# Patient Record
Sex: FEMALE | Race: WHITE | NOT HISPANIC OR LATINO
[De-identification: names, ages, dates, MRNs, and addresses within clinical notes are randomized per-mention and may not be internally consistent; named-entity substitution may affect disease eponyms.]

---

## 2017-01-10 ENCOUNTER — APPOINTMENT (OUTPATIENT)
Dept: DERMATOLOGY | Facility: CLINIC | Age: 72
End: 2017-01-10

## 2017-02-28 ENCOUNTER — APPOINTMENT (OUTPATIENT)
Dept: OBGYN | Facility: CLINIC | Age: 72
End: 2017-02-28

## 2017-02-28 VITALS
BODY MASS INDEX: 20.61 KG/M2 | HEIGHT: 68 IN | WEIGHT: 136 LBS | DIASTOLIC BLOOD PRESSURE: 74 MMHG | SYSTOLIC BLOOD PRESSURE: 122 MMHG

## 2017-02-28 DIAGNOSIS — F32.9 ANXIETY DISORDER, UNSPECIFIED: ICD-10-CM

## 2017-02-28 DIAGNOSIS — E03.9 HYPOTHYROIDISM, UNSPECIFIED: ICD-10-CM

## 2017-02-28 DIAGNOSIS — F41.9 ANXIETY DISORDER, UNSPECIFIED: ICD-10-CM

## 2017-02-28 RX ORDER — BUPROPION HYDROCHLORIDE 150 MG/1
150 TABLET, EXTENDED RELEASE ORAL
Refills: 0 | Status: ACTIVE | COMMUNITY

## 2017-03-06 LAB — CYTOLOGY CVX/VAG DOC THIN PREP: NORMAL

## 2017-12-28 DIAGNOSIS — N64.89 OTHER SPECIFIED DISORDERS OF BREAST: ICD-10-CM

## 2018-01-29 ENCOUNTER — APPOINTMENT (OUTPATIENT)
Dept: DERMATOLOGY | Facility: CLINIC | Age: 73
End: 2018-01-29
Payer: MEDICARE

## 2018-01-29 PROCEDURE — 17000 DESTRUCT PREMALG LESION: CPT

## 2018-01-29 PROCEDURE — 99214 OFFICE O/P EST MOD 30 MIN: CPT | Mod: 25

## 2018-03-01 ENCOUNTER — APPOINTMENT (OUTPATIENT)
Dept: OBGYN | Facility: CLINIC | Age: 73
End: 2018-03-01
Payer: MEDICARE

## 2018-03-01 VITALS
SYSTOLIC BLOOD PRESSURE: 128 MMHG | WEIGHT: 136 LBS | BODY MASS INDEX: 20.61 KG/M2 | DIASTOLIC BLOOD PRESSURE: 80 MMHG | HEIGHT: 68 IN

## 2018-03-01 PROCEDURE — 99214 OFFICE O/P EST MOD 30 MIN: CPT

## 2018-06-25 ENCOUNTER — APPOINTMENT (OUTPATIENT)
Dept: DERMATOLOGY | Facility: CLINIC | Age: 73
End: 2018-06-25
Payer: MEDICARE

## 2018-06-25 PROCEDURE — 17003 DESTRUCT PREMALG LES 2-14: CPT

## 2018-06-25 PROCEDURE — 17000 DESTRUCT PREMALG LESION: CPT

## 2018-06-25 PROCEDURE — 99213 OFFICE O/P EST LOW 20 MIN: CPT | Mod: 25

## 2018-12-05 ENCOUNTER — APPOINTMENT (OUTPATIENT)
Dept: DERMATOLOGY | Facility: CLINIC | Age: 73
End: 2018-12-05
Payer: MEDICARE

## 2018-12-05 PROCEDURE — 99214 OFFICE O/P EST MOD 30 MIN: CPT | Mod: 25

## 2018-12-05 PROCEDURE — 17000 DESTRUCT PREMALG LESION: CPT | Mod: 59

## 2018-12-05 PROCEDURE — 17110 DESTRUCTION B9 LES UP TO 14: CPT

## 2019-01-28 ENCOUNTER — APPOINTMENT (OUTPATIENT)
Dept: DERMATOLOGY | Facility: CLINIC | Age: 74
End: 2019-01-28

## 2019-04-05 ENCOUNTER — APPOINTMENT (OUTPATIENT)
Dept: OBGYN | Facility: CLINIC | Age: 74
End: 2019-04-05
Payer: MEDICARE

## 2019-04-05 VITALS
BODY MASS INDEX: 20.61 KG/M2 | WEIGHT: 136 LBS | SYSTOLIC BLOOD PRESSURE: 120 MMHG | HEIGHT: 68 IN | DIASTOLIC BLOOD PRESSURE: 80 MMHG

## 2019-04-05 PROCEDURE — 82270 OCCULT BLOOD FECES: CPT

## 2019-04-05 PROCEDURE — G0101: CPT

## 2019-04-05 NOTE — PHYSICAL EXAM
[Awake] : awake [Alert] : alert [Soft] : soft [Oriented x3] : oriented to person, place, and time [Normal] : uterus [No Bleeding] : there was no active vaginal bleeding [Uterine Adnexae] : were not tender and not enlarged [RRR, No Murmurs] : RRR, no murmurs [CTAB] : CTAB [Acute Distress] : no acute distress [Mass] : no breast mass [Nipple Discharge] : no nipple discharge [Axillary LAD] : no axillary lymphadenopathy [Tender] : non tender [Occult Blood] : occult blood test from digital rectal exam was negative

## 2019-04-08 ENCOUNTER — APPOINTMENT (OUTPATIENT)
Dept: DERMATOLOGY | Facility: CLINIC | Age: 74
End: 2019-04-08
Payer: MEDICARE

## 2019-04-08 PROCEDURE — 99214 OFFICE O/P EST MOD 30 MIN: CPT

## 2019-04-11 LAB — CYTOLOGY CVX/VAG DOC THIN PREP: NORMAL

## 2019-05-23 ENCOUNTER — RESULT REVIEW (OUTPATIENT)
Age: 74
End: 2019-05-23

## 2019-05-23 ENCOUNTER — APPOINTMENT (OUTPATIENT)
Dept: DERMATOLOGY | Facility: CLINIC | Age: 74
End: 2019-05-23
Payer: MEDICARE

## 2019-05-23 PROCEDURE — 11104 PUNCH BX SKIN SINGLE LESION: CPT

## 2019-05-23 PROCEDURE — 99213 OFFICE O/P EST LOW 20 MIN: CPT | Mod: 25

## 2019-06-06 ENCOUNTER — APPOINTMENT (OUTPATIENT)
Dept: DERMATOLOGY | Facility: CLINIC | Age: 74
End: 2019-06-06
Payer: MEDICARE

## 2019-06-06 PROCEDURE — ZZZZZ: CPT

## 2019-12-13 ENCOUNTER — APPOINTMENT (OUTPATIENT)
Dept: ORTHOPEDIC SURGERY | Facility: CLINIC | Age: 74
End: 2019-12-13

## 2020-06-01 ENCOUNTER — APPOINTMENT (OUTPATIENT)
Dept: PULMONOLOGY | Facility: CLINIC | Age: 75
End: 2020-06-01
Payer: MEDICARE

## 2020-06-01 VITALS
DIASTOLIC BLOOD PRESSURE: 80 MMHG | SYSTOLIC BLOOD PRESSURE: 138 MMHG | WEIGHT: 128 LBS | BODY MASS INDEX: 20.09 KG/M2 | HEART RATE: 84 BPM | HEIGHT: 67 IN | RESPIRATION RATE: 16 BRPM | OXYGEN SATURATION: 98 %

## 2020-06-01 PROCEDURE — 99204 OFFICE O/P NEW MOD 45 MIN: CPT

## 2020-06-01 RX ORDER — LEVOTHYROXINE SODIUM 0.07 MG/1
75 TABLET ORAL
Qty: 90 | Refills: 0 | Status: DISCONTINUED | COMMUNITY
Start: 2020-01-22 | End: 2020-06-01

## 2020-06-01 RX ORDER — ESTRADIOL 0.1 MG/G
0.1 CREAM VAGINAL
Qty: 1 | Refills: 3 | Status: DISCONTINUED | COMMUNITY
Start: 2018-03-01 | End: 2020-06-01

## 2020-06-01 RX ORDER — VALACYCLOVIR 1 G/1
1 TABLET, FILM COATED ORAL
Qty: 10 | Refills: 0 | Status: ACTIVE | COMMUNITY
Start: 2020-01-22

## 2020-06-01 RX ORDER — IBUPROFEN 800 MG/1
800 TABLET, FILM COATED ORAL
Qty: 30 | Refills: 0 | Status: ACTIVE | COMMUNITY
Start: 2020-05-01

## 2020-06-01 RX ORDER — HYDROCODONE BITARTRATE AND HOMATROPINE METHYLBROMIDE 5; 1.5 MG/5ML; MG/5ML
5-1.5 SOLUTION ORAL
Qty: 240 | Refills: 0 | Status: ACTIVE | COMMUNITY
Start: 2020-04-13

## 2020-06-01 RX ORDER — LEVOTHYROXINE SODIUM 0.09 MG/1
88 TABLET ORAL
Qty: 90 | Refills: 0 | Status: ACTIVE | COMMUNITY
Start: 2020-04-18

## 2020-06-01 RX ORDER — LEVOTHYROXINE SODIUM 0.05 MG/1
50 TABLET ORAL
Refills: 0 | Status: DISCONTINUED | COMMUNITY
End: 2020-06-01

## 2020-06-01 RX ORDER — OMEPRAZOLE 40 MG/1
40 CAPSULE, DELAYED RELEASE ORAL
Qty: 90 | Refills: 0 | Status: ACTIVE | COMMUNITY
Start: 2020-04-18

## 2020-06-01 NOTE — CONSULT LETTER
[Dear  ___] : Dear  [unfilled], [Consult Letter:] : I had the pleasure of evaluating your patient, [unfilled]. [Please see my note below.] : Please see my note below. [Consult Closing:] : Thank you very much for allowing me to participate in the care of this patient.  If you have any questions, please do not hesitate to contact me. [DrBekah  ___] : Dr. ANNA [Sincerely,] : Sincerely,

## 2020-06-01 NOTE — PHYSICAL EXAM
[No Acute Distress] : no acute distress [Normal Oropharynx] : normal oropharynx [Normal Appearance] : normal appearance [No Neck Mass] : no neck mass [Normal Rate/Rhythm] : normal rate/rhythm [Normal S1, S2] : normal s1, s2 [No Murmurs] : no murmurs [No Resp Distress] : no resp distress [Clear to Auscultation Bilaterally] : clear to auscultation bilaterally [No Abnormalities] : no abnormalities [Benign] : benign [Normal Gait] : normal gait [No Edema] : no edema [No Clubbing] : no clubbing [No Focal Deficits] : no focal deficits [Normal Color/ Pigmentation] : normal color/ pigmentation [Oriented x3] : oriented x3 [Normal Affect] : normal affect

## 2020-06-09 NOTE — HISTORY OF PRESENT ILLNESS
[TextBox_4] : Chronic recurrent cough for 30 years\par Some rhinitis\par Worse with cold exposure \par Some green mucous at times\par No better with azithromycin

## 2020-06-19 ENCOUNTER — APPOINTMENT (OUTPATIENT)
Dept: DERMATOLOGY | Facility: CLINIC | Age: 75
End: 2020-06-19
Payer: MEDICARE

## 2020-06-19 PROCEDURE — 99214 OFFICE O/P EST MOD 30 MIN: CPT | Mod: 25

## 2020-06-19 PROCEDURE — 17000 DESTRUCT PREMALG LESION: CPT

## 2020-06-19 PROCEDURE — 17003 DESTRUCT PREMALG LES 2-14: CPT

## 2020-08-06 ENCOUNTER — APPOINTMENT (OUTPATIENT)
Dept: PULMONOLOGY | Facility: CLINIC | Age: 75
End: 2020-08-06
Payer: MEDICARE

## 2020-08-06 VITALS
WEIGHT: 135 LBS | OXYGEN SATURATION: 95 % | BODY MASS INDEX: 21.14 KG/M2 | HEART RATE: 85 BPM | DIASTOLIC BLOOD PRESSURE: 84 MMHG | SYSTOLIC BLOOD PRESSURE: 126 MMHG

## 2020-08-06 DIAGNOSIS — J30.9 ALLERGIC RHINITIS, UNSPECIFIED: ICD-10-CM

## 2020-08-06 DIAGNOSIS — R05 COUGH: ICD-10-CM

## 2020-08-06 PROCEDURE — 99213 OFFICE O/P EST LOW 20 MIN: CPT

## 2020-08-06 NOTE — ASSESSMENT
[FreeTextEntry1] : Chronic Cough\par Appears to be mild and within normal limits - certainly not progressive\par Allergic rhinitis\par No significant allergy identified\par Will culture sputum when available - antibiotics have not helped in the past\par Little to add

## 2020-08-06 NOTE — PHYSICAL EXAM
[Normal Oropharynx] : normal oropharynx [No Acute Distress] : no acute distress [No Neck Mass] : no neck mass [Normal Appearance] : normal appearance [Normal S1, S2] : normal s1, s2 [Normal Rate/Rhythm] : normal rate/rhythm [Clear to Auscultation Bilaterally] : clear to auscultation bilaterally [No Murmurs] : no murmurs [No Resp Distress] : no resp distress [Benign] : benign [No Abnormalities] : no abnormalities [Normal Gait] : normal gait [No Clubbing] : no clubbing [No Edema] : no edema [Normal Color/ Pigmentation] : normal color/ pigmentation [No Focal Deficits] : no focal deficits [Oriented x3] : oriented x3 [Normal Affect] : normal affect

## 2020-08-06 NOTE — CONSULT LETTER
[Dear  ___] : Dear  [unfilled], [Consult Letter:] : I had the pleasure of evaluating your patient, [unfilled]. [Please see my note below.] : Please see my note below. [Consult Closing:] : Thank you very much for allowing me to participate in the care of this patient.  If you have any questions, please do not hesitate to contact me. [Sincerely,] : Sincerely, [DrBekah  ___] : Dr. ANNA

## 2020-08-06 NOTE — HISTORY OF PRESENT ILLNESS
[TextBox_4] : Chronic recurrent cough for 30 years\par Some rhinitis\par Worse with cold exposure \par Some green mucous at times\par No better with azithromycin\par \par 8/6/20\par Some green mucous now\par No rhinorrhea

## 2020-10-26 ENCOUNTER — APPOINTMENT (OUTPATIENT)
Dept: OBGYN | Facility: CLINIC | Age: 75
End: 2020-10-26
Payer: MEDICARE

## 2020-10-26 VITALS
SYSTOLIC BLOOD PRESSURE: 156 MMHG | WEIGHT: 135 LBS | BODY MASS INDEX: 21.19 KG/M2 | HEIGHT: 67 IN | DIASTOLIC BLOOD PRESSURE: 90 MMHG

## 2020-10-26 DIAGNOSIS — Z78.0 ASYMPTOMATIC MENOPAUSAL STATE: ICD-10-CM

## 2020-10-26 DIAGNOSIS — N95.2 POSTMENOPAUSAL ATROPHIC VAGINITIS: ICD-10-CM

## 2020-10-26 PROCEDURE — 99213 OFFICE O/P EST LOW 20 MIN: CPT

## 2020-10-26 NOTE — PHYSICAL EXAM
[Appropriately responsive] : appropriately responsive [Alert] : alert [No Acute Distress] : no acute distress [No Lymphadenopathy] : no lymphadenopathy [Regular Rate Rhythm] : regular rate rhythm [No Murmurs] : no murmurs [Clear to Auscultation B/L] : clear to auscultation bilaterally [Soft] : soft [Non-tender] : non-tender [Non-distended] : non-distended [No HSM] : No HSM [No Lesions] : no lesions [No Mass] : no mass [Oriented x3] : oriented x3 [Examination Of The Breasts] : a normal appearance [No Masses] : no breast masses were palpable [Labia Majora] : normal [Labia Minora] : normal [Normal] : normal [Uterine Adnexae] : normal [No Tenderness] : no tenderness [Nl Sphincter Tone] : normal sphincter tone [FreeTextEntry9] : guaiac neg.

## 2020-10-26 NOTE — HISTORY OF PRESENT ILLNESS
[FreeTextEntry1] : 76 yo pt here for gyn visit. has osteopenia, taking theo, vit d , doing exercise. due for fu dexa. \par no other gyn co. \par dil Lorena Polanco's mother passed w covid.

## 2020-10-30 ENCOUNTER — APPOINTMENT (OUTPATIENT)
Dept: ORTHOPEDIC SURGERY | Facility: CLINIC | Age: 75
End: 2020-10-30
Payer: MEDICARE

## 2020-10-30 VITALS
HEART RATE: 84 BPM | DIASTOLIC BLOOD PRESSURE: 86 MMHG | BODY MASS INDEX: 21.19 KG/M2 | WEIGHT: 135 LBS | HEIGHT: 67 IN | SYSTOLIC BLOOD PRESSURE: 150 MMHG

## 2020-10-30 PROCEDURE — 73502 X-RAY EXAM HIP UNI 2-3 VIEWS: CPT | Mod: RT

## 2020-10-30 PROCEDURE — 20610 DRAIN/INJ JOINT/BURSA W/O US: CPT | Mod: RT

## 2020-10-30 PROCEDURE — 99203 OFFICE O/P NEW LOW 30 MIN: CPT

## 2020-10-30 NOTE — PHYSICAL EXAM
[LE] : Sensory: Intact in bilateral lower extremities [ALL] : dorsalis pedis, posterior tibial, femoral, popliteal, and radial 2+ and symmetric bilaterally [Antalgic] : not antalgic [de-identified] : GENERAL APPEARANCE: Well nourished and hydrated, pleasant, alert, and oriented x 3. Appears their stated age. \par HEENT: Normocephalic, extraocular eye motion intact. Nasal septum midline. Oral cavity clear. External auditory canal clear. \par RESPIRATORY: Breath sounds clear and audible in all lobes. No wheezing, No accessory muscle use.\par CARDIOVASCULAR: No apparent abnormalities. No lower leg edema. No varicosities. Pedal pulses are palpable.\par NEUROLOGIC: Sensation is normal, no muscle weakness in the upper or lower extremities.\par DERMATOLOGIC: No apparent skin lesions, moist, warm, no rash.\par SPINE: Cervical spine appears normal and moves freely; thoracic spine appears normal and moves freely; lumbosacral spine appears normal and moves freely, normal, nontender.\par MUSCULOSKELETAL: Hands, wrists, and elbows are normal and move freely, shoulders are normal and move freely. \par Musculoskeletal: Gait: normal.  [de-identified] : right hip examination shows a range of motion and no groin pain pain with straight leg raise. pt had tenderness and pain over greater trochanteric bursa [de-identified] : 3V xray of the right hip done in the office today and reviewed by Dr. Reji Orozco demonstrates neutral alignment with no evidence of fractures, dislocations, or pathological lesions. \par \par \par \par MRI of her lumbar spine performed on 2/6/2020 showed the following:\par 1. Multilevel degenerative disc disease and spondylosis with mild to moderate upper lumbar levoscoliosis. Grade 1 retrolisthesis at L1-2. Grade 1 anterolisthesis at L4-5.\par \par 2. Mild disc bulge at L1-2 with broad-based right paracentral herniation moderately narrowing the right neural foramen. \par \par 3. Moderate disc bulge at L2-3 with mild to moderate bilateral foraminal stenosis.\par \par 4. Moderate right eccentric disc bulge at L3-4 with mild bilateral foraminal stenosis. \par \par 5. Mild disc bulge at L4-5 with facet joint hypertrophy and mild bilateral foraminal narrowing. \par \par 6. Mild to moderate left eccentric disc bulge at L5-S1 with bilateral facet joint hypertrophy and moderate left foraminal stenosis.

## 2020-10-30 NOTE — PROCEDURE
[de-identified] : I injected the patient's right greater trochanter hip bursa today with cortisone.\par \par I discussed at length with the patient the planned steroid and lidocaine injection. The risks, benefits, convalescence and alternatives were reviewed. The possible side effects discussed included but were not limited to: pain, swelling, heat, bleeding, and redness. Symptoms are generally mild but if they are extensive then contact the office. Giving pain relievers by mouth such as NSAIDs or Tylenol can generally treat the reactions to steroid and lidocaine. Rare cases of infection have been noted. Rash, hives and itching may occur post injection. If you have muscle pain or cramps, flushing and or swelling of the face, rapid heart beat, nausea, dizziness, fever, chills, headache, difficulty breathing, swelling in the arms or legs, or have a prickly feeling of your skin, contact a health care provider immediately. Following this discussion, the knee was prepped with Alcohol and under sterile condition the 80 mg Depo-Medrol and 6 cc Lidocaine injection was performed with a 20 gauge needle through a superolateral injection site. The needle was introduced into the joint, aspiration was performed to ensure intra-articular placement and the medication was injected. Upon withdrawal of the needle the site was cleaned with alcohol and a band aid applied. The patient tolerated the injection well and there were no adverse effects. Post injection instructions included no strenuous activity for 24 hours, cryotherapy and if there are any adverse effects to contact the office.

## 2020-10-30 NOTE — END OF VISIT
[FreeTextEntry3] : I, Tripp Cain, acted solely as a scribe for Dr. Reji Orozco on this date 10/30/2020.

## 2020-10-30 NOTE — DISCUSSION/SUMMARY
[de-identified] : 74 y/o F with right hip bursitis. Conservative therapy and surgical options discussed in detail with the patient. We reassured the pt that she has neutral alignment with no evidence of fractures, dislocations, or pathological lesions. The patient is not a candidate for a right JAMES. Pt opted to receive a cortisone injection in the right hip bursa today and tolerated it well. F/u with us in three months for repeat cortisone injections if needed.

## 2020-10-30 NOTE — HISTORY OF PRESENT ILLNESS
[Sitting] : worsened by sitting [Pain Location] : pain [Stable] : stable [5] : a current pain level of 5/10 [7] : a maximum pain level of 7/10 [Direct Pressure] : worsened by direct pressure [Hip Movement] : worsened by hip movement [de-identified] : 76 y/o F presents with right hip pain for 1 year. The pain is localized to the lateral hip  She is experiencing intermittent pain that she describes as stabbing. Standing alleviates her pain. Lying down and sitting for a prolonged period of time exacerbates her pain. The pt saw Dr. Cope in February 2020 and received bursa injections 2 times. She was in PT in January 2020 without improvement. [Ataxia] : no ataxia [Incontinence] : no incontinence [Loss of Dexterity] : good dexterity [Urinary Ret.] : no urinary retention [de-identified] : lying down  [de-identified] : standing

## 2020-11-30 ENCOUNTER — RX RENEWAL (OUTPATIENT)
Age: 75
End: 2020-11-30

## 2021-01-27 ENCOUNTER — TRANSCRIPTION ENCOUNTER (OUTPATIENT)
Age: 76
End: 2021-01-27

## 2021-02-25 ENCOUNTER — RX RENEWAL (OUTPATIENT)
Age: 76
End: 2021-02-25

## 2021-06-16 ENCOUNTER — APPOINTMENT (OUTPATIENT)
Dept: DERMATOLOGY | Facility: CLINIC | Age: 76
End: 2021-06-16
Payer: MEDICARE

## 2021-06-16 PROCEDURE — 99213 OFFICE O/P EST LOW 20 MIN: CPT

## 2021-07-23 ENCOUNTER — APPOINTMENT (OUTPATIENT)
Dept: ORTHOPEDIC SURGERY | Facility: CLINIC | Age: 76
End: 2021-07-23
Payer: MEDICARE

## 2021-07-23 VITALS
BODY MASS INDEX: 21.19 KG/M2 | DIASTOLIC BLOOD PRESSURE: 79 MMHG | SYSTOLIC BLOOD PRESSURE: 142 MMHG | HEART RATE: 77 BPM | HEIGHT: 67 IN | WEIGHT: 135 LBS

## 2021-07-23 PROCEDURE — 99213 OFFICE O/P EST LOW 20 MIN: CPT | Mod: 25

## 2021-07-23 PROCEDURE — 20610 DRAIN/INJ JOINT/BURSA W/O US: CPT

## 2021-07-23 NOTE — PROCEDURE
[de-identified] : I injected the patient's right greater trochanter hip bursa today with cortisone.\par \par I discussed at length with the patient the planned steroid and lidocaine injection. The risks, benefits, convalescence and alternatives were reviewed. The possible side effects discussed included but were not limited to: pain, swelling, heat, bleeding, and redness. Symptoms are generally mild but if they are extensive then contact the office. Giving pain relievers by mouth such as NSAIDs or Tylenol can generally treat the reactions to steroid and lidocaine. Rare cases of infection have been noted. Rash, hives and itching may occur post injection. If you have muscle pain or cramps, flushing and or swelling of the face, rapid heart beat, nausea, dizziness, fever, chills, headache, difficulty breathing, swelling in the arms or legs, or have a prickly feeling of your skin, contact a health care provider immediately. Following this discussion, the knee was prepped with Alcohol and under sterile condition the 80 mg Depo-Medrol and 6 cc Lidocaine injection was performed with a 20 gauge needle through a superolateral injection site. The needle was introduced into the joint, aspiration was performed to ensure intra-articular placement and the medication was injected. Upon withdrawal of the needle the site was cleaned with alcohol and a band aid applied. The patient tolerated the injection well and there were no adverse effects. Post injection instructions included no strenuous activity for 24 hours, cryotherapy and if there are any adverse effects to contact the office.

## 2021-07-23 NOTE — DISCUSSION/SUMMARY
[Medication Risks Reviewed] : Medication risks reviewed [de-identified] : 77 y/o F with right hip bursitis. She has been symptomatic recently and would like to continue with conservative therapies for pain management. At her last appointment, she received a greater trochanteric right hip bursa injection which provided relief. Today she opted for a repeat cortisone injection in the right hip bursa. F/u with us in three months for repeat cortisone injections if needed.

## 2021-07-23 NOTE — PHYSICAL EXAM
[LE] : Sensory: Intact in bilateral lower extremities [ALL] : dorsalis pedis, posterior tibial, femoral, popliteal, and radial 2+ and symmetric bilaterally [Antalgic] : not antalgic [de-identified] : GENERAL APPEARANCE: Well nourished and hydrated, pleasant, alert, and oriented x 3. Appears their stated age. \par HEENT: Normocephalic, extraocular eye motion intact. Nasal septum midline. Oral cavity clear. External auditory canal clear. \par RESPIRATORY: Breath sounds clear and audible in all lobes. No wheezing, No accessory muscle use.\par CARDIOVASCULAR: No apparent abnormalities. No lower leg edema. No varicosities. Pedal pulses are palpable.\par NEUROLOGIC: Sensation is normal, no muscle weakness in the upper or lower extremities.\par DERMATOLOGIC: No apparent skin lesions, moist, warm, no rash.\par SPINE: Cervical spine appears normal and moves freely; thoracic spine appears normal and moves freely; lumbosacral spine appears normal and moves freely, normal, nontender.\par MUSCULOSKELETAL: Hands, wrists, and elbows are normal and move freely, shoulders are normal and move freely. \par Musculoskeletal: Gait: normal.  [de-identified] : right hip examination shows a range of motion and no groin pain pain with straight leg raise. pt had tenderness and pain over greater trochanteric bursa

## 2021-07-23 NOTE — END OF VISIT
[FreeTextEntry3] : I, Tripp Cain, acted solely as a scribe for Dr. Reji Orozco on this date 07/23/2021.

## 2021-07-23 NOTE — HISTORY OF PRESENT ILLNESS
[Pain Location] : pain [Stable] : stable [5] : a current pain level of 5/10 [7] : a maximum pain level of 7/10 [Direct Pressure] : worsened by direct pressure [Hip Movement] : worsened by hip movement [Sitting] : worsened by sitting [de-identified] : 77 y/o F presents with right hip pain. The pain is localized to the lateral hip  Her pain is worse when she lays down on either side as well as sitting for prolonged periods of time.  The pt saw Dr. Cope in February 2020 and received bursa injections 2 times. She was in PT in January 2020 without improvement. At her last appointment in October 2020, she received right hip bursa injection.  [Ataxia] : no ataxia [Incontinence] : no incontinence [Loss of Dexterity] : good dexterity [Urinary Ret.] : no urinary retention [de-identified] : lying down  [de-identified] : standing

## 2021-09-23 ENCOUNTER — TRANSCRIPTION ENCOUNTER (OUTPATIENT)
Age: 76
End: 2021-09-23

## 2021-11-16 ENCOUNTER — APPOINTMENT (OUTPATIENT)
Dept: ORTHOPEDIC SURGERY | Facility: CLINIC | Age: 76
End: 2021-11-16
Payer: MEDICARE

## 2021-11-16 VITALS
BODY MASS INDEX: 21.19 KG/M2 | HEIGHT: 67 IN | SYSTOLIC BLOOD PRESSURE: 164 MMHG | DIASTOLIC BLOOD PRESSURE: 81 MMHG | WEIGHT: 135 LBS | HEART RATE: 99 BPM

## 2021-11-16 PROCEDURE — 99214 OFFICE O/P EST MOD 30 MIN: CPT | Mod: 25

## 2021-11-16 PROCEDURE — 20610 DRAIN/INJ JOINT/BURSA W/O US: CPT | Mod: RT

## 2021-11-16 PROCEDURE — 73562 X-RAY EXAM OF KNEE 3: CPT | Mod: 26,LT

## 2021-11-16 NOTE — HISTORY OF PRESENT ILLNESS
[5] : a current pain level of 5/10 [de-identified] : 75 y/o F presents with right hip pain for 1 year. the pain is in the lateral hip  She is experiencing intermittent pain that she describes as stabbing. Standing alleviates her pain. Lying down and sitting for a prolonged period of time exacerbates her pain. The pt saw Dr. Cope in February 2020 and received bursa injections 2 times. She was in PT in January 2020 without improvement\par .today she c/o 2m hx of medial left knee pain, the pain intermittent\par she " sees starts" with bending and crossing the leg \par she is active doing palates , she thinks she may have hurt the knee when exercising

## 2021-11-16 NOTE — PHYSICAL EXAM
[LE] : Sensory: Intact in bilateral lower extremities [ALL] : dorsalis pedis, posterior tibial, femoral, popliteal, and radial 2+ and symmetric bilaterally [Antalgic] : not antalgic [de-identified] : GENERAL APPEARANCE: Well nourished and hydrated, pleasant, alert, and oriented x 3. Appears their stated age. \par HEENT: Normocephalic, extraocular eye motion intact. Nasal septum midline. Oral cavity clear. External auditory canal clear. \par RESPIRATORY: Breath sounds clear and audible in all lobes. No wheezing, No accessory muscle use.\par CARDIOVASCULAR: No apparent abnormalities. No lower leg edema. No varicosities. Pedal pulses are palpable.\par NEUROLOGIC: Sensation is normal, no muscle weakness in the upper or lower extremities.\par DERMATOLOGIC: No apparent skin lesions, moist, warm, no rash.\par SPINE: Cervical spine appears normal and moves freely; thoracic spine appears normal and moves freely; lumbosacral spine appears normal and moves freely, normal, nontender.\par MUSCULOSKELETAL: Hands, wrists, and elbows are normal and move freely, shoulders are normal and move freely. \par Musculoskeletal: Gait: normal.  [de-identified] :  right hip examination shows a range of motion and no groin pain pain with straight leg raise. pt had tenderness and pain over greater trochanteric bursa\par left knee exam showed medial joint line tenderness\par  ROM is 0-140 degree + mcmarrys  [de-identified] : 3 view left knee xray showed mild o.a without  any evidence of fracture, dislocation,\par  or any other acute orthopedic pathology.

## 2021-11-16 NOTE — PROCEDURE
[de-identified] : I injected the patient's right greater trochanter hip bursa today with cortisone.\par \par I discussed at length with the patient the planned steroid and lidocaine injection. The risks, benefits, convalescence and alternatives were reviewed. The possible side effects discussed included but were not limited to: pain, swelling, heat, bleeding, and redness. Symptoms are generally mild but if they are extensive then contact the office. Giving pain relievers by mouth such as NSAIDs or Tylenol can generally treat the reactions to steroid and lidocaine. Rare cases of infection have been noted. Rash, hives and itching may occur post injection. If you have muscle pain or cramps, flushing and or swelling of the face, rapid heart beat, nausea, dizziness, fever, chills, headache, difficulty breathing, swelling in the arms or legs, or have a prickly feeling of your skin, contact a health care provider immediately. Following this discussion, the knee was prepped with Alcohol and under sterile condition the 80 mg Depo-Medrol and 6 cc Lidocaine injection was performed with a 20 gauge needle through a superolateral injection site. The needle was introduced into the joint, aspiration was performed to ensure intra-articular placement and the medication was injected. Upon withdrawal of the needle the site was cleaned with alcohol and a band aid applied. The patient tolerated the injection well and there were no adverse effects. Post injection instructions included no strenuous activity for 24 hours, cryotherapy and if there are any adverse effects to contact the office.

## 2021-11-16 NOTE — DISCUSSION/SUMMARY
[de-identified] : Medication risks reviewed. 77 y/o F with right hip bursitis and mild left knee o.a . the hip pain has been symptomatic  for 1 year and the pain is under control with hip injection and would like to continue with conservative therapies for pain management. Today she opted for a repeat cortisone injection in the right hip bursa. I ordered left knee MRI without contrast to r/o meniscus tear F/u with us when the MRI is completed

## 2021-12-01 ENCOUNTER — APPOINTMENT (OUTPATIENT)
Dept: ORTHOPEDIC SURGERY | Facility: CLINIC | Age: 76
End: 2021-12-01
Payer: MEDICARE

## 2021-12-01 DIAGNOSIS — M17.12 UNILATERAL PRIMARY OSTEOARTHRITIS, LEFT KNEE: ICD-10-CM

## 2021-12-01 DIAGNOSIS — M70.61 TROCHANTERIC BURSITIS, RIGHT HIP: ICD-10-CM

## 2021-12-01 DIAGNOSIS — S83.282A OTHER TEAR OF LATERAL MENISCUS, CURRENT INJURY, LEFT KNEE, INITIAL ENCOUNTER: ICD-10-CM

## 2021-12-01 PROCEDURE — 99441: CPT | Mod: 95

## 2021-12-01 NOTE — DISCUSSION/SUMMARY
[de-identified] : The patient had an MRI of her left knee.  She does have a small lateral horizontal meniscus tear.  Overall her symptoms are very well controlled.  She still remains very active.  She notes some discomfort when she uncrosses her unfolds her legs.  Otherwise she is tolerating her symptoms well.  While theoretically she is a candidate for arthroscopy I think she is doing too well and this tear is more degenerative in nature than I recommend surgery I encouraged her to continue with conservative treatment.  She will follow-up with us as needed..

## 2021-12-01 NOTE — HISTORY OF PRESENT ILLNESS
[Home] : at home, [unfilled] , at the time of the visit. [Medical Office: ([facility])___] : at the medical office located in  [Verbal consent obtained from patient] : the patient, [unfilled] [de-identified] : The patient had an MRI of her left knee.  She does have a small lateral horizontal meniscus tear.  Overall her symptoms are very well controlled.  She still remains very active.  She notes some discomfort when she uncrosses her unfolds her legs.  Otherwise she is tolerating her symptoms well.  While theoretically she is a candidate for arthroscopy I think she is doing too well and this tear is more degenerative in nature than I recommend surgery I encouraged her to continue with conservative treatment.  She will follow-up with us as needed..

## 2021-12-13 ENCOUNTER — APPOINTMENT (OUTPATIENT)
Dept: OBGYN | Facility: CLINIC | Age: 76
End: 2021-12-13
Payer: MEDICARE

## 2021-12-13 VITALS
WEIGHT: 116 LBS | HEIGHT: 67 IN | SYSTOLIC BLOOD PRESSURE: 132 MMHG | DIASTOLIC BLOOD PRESSURE: 80 MMHG | BODY MASS INDEX: 18.21 KG/M2

## 2021-12-13 PROCEDURE — G0101: CPT

## 2021-12-18 LAB — CYTOLOGY CVX/VAG DOC THIN PREP: ABNORMAL

## 2022-06-24 ENCOUNTER — APPOINTMENT (OUTPATIENT)
Dept: DERMATOLOGY | Facility: CLINIC | Age: 77
End: 2022-06-24

## 2022-06-29 ENCOUNTER — APPOINTMENT (OUTPATIENT)
Dept: DERMATOLOGY | Facility: CLINIC | Age: 77
End: 2022-06-29

## 2022-06-29 PROCEDURE — 17110 DESTRUCTION B9 LES UP TO 14: CPT

## 2022-06-29 PROCEDURE — 99213 OFFICE O/P EST LOW 20 MIN: CPT | Mod: 25

## 2022-12-15 ENCOUNTER — APPOINTMENT (OUTPATIENT)
Dept: OBGYN | Facility: CLINIC | Age: 77
End: 2022-12-15

## 2022-12-15 VITALS
DIASTOLIC BLOOD PRESSURE: 70 MMHG | HEIGHT: 67 IN | WEIGHT: 118 LBS | SYSTOLIC BLOOD PRESSURE: 140 MMHG | BODY MASS INDEX: 18.52 KG/M2

## 2022-12-15 DIAGNOSIS — Z13.820 ENCOUNTER FOR SCREENING FOR OSTEOPOROSIS: ICD-10-CM

## 2022-12-15 DIAGNOSIS — M85.89 OTHER SPECIFIED DISORDERS OF BONE DENSITY AND STRUCTURE, MULTIPLE SITES: ICD-10-CM

## 2022-12-15 DIAGNOSIS — Z12.31 ENCOUNTER FOR SCREENING MAMMOGRAM FOR MALIGNANT NEOPLASM OF BREAST: ICD-10-CM

## 2022-12-15 PROCEDURE — 99213 OFFICE O/P EST LOW 20 MIN: CPT

## 2022-12-17 NOTE — PHYSICAL EXAM
[Appropriately responsive] : appropriately responsive [Alert] : alert [No Acute Distress] : no acute distress [No Lymphadenopathy] : no lymphadenopathy [Regular Rate Rhythm] : regular rate rhythm [No Murmurs] : no murmurs [Clear to Auscultation B/L] : clear to auscultation bilaterally [Soft] : soft [Non-tender] : non-tender [Non-distended] : non-distended [No HSM] : No HSM [No Lesions] : no lesions [No Mass] : no mass [Oriented x3] : oriented x3 [Examination Of The Breasts] : a normal appearance [No Masses] : no breast masses were palpable [Labia Majora] : normal [Labia Minora] : normal [Normal] : normal [Uterine Adnexae] : normal [No Tenderness] : no tenderness [FreeTextEntry9] : gina

## 2022-12-17 NOTE — HISTORY OF PRESENT ILLNESS
[FreeTextEntry1] : 76 yo pt started gluten free diet, helped w gi problems. \par has mild osteopenia, taking vit d.

## 2022-12-17 NOTE — DISCUSSION/SUMMARY
[FreeTextEntry1] : osteopenia- theo vit d  exercize, check dexa\par nl breast exam, ref mammo. \par

## 2023-04-25 ENCOUNTER — APPOINTMENT (OUTPATIENT)
Dept: ORTHOPEDIC SURGERY | Facility: CLINIC | Age: 78
End: 2023-04-25

## 2023-07-14 ENCOUNTER — APPOINTMENT (OUTPATIENT)
Dept: DERMATOLOGY | Facility: CLINIC | Age: 78
End: 2023-07-14

## 2023-08-10 ENCOUNTER — APPOINTMENT (OUTPATIENT)
Dept: DERMATOLOGY | Facility: CLINIC | Age: 78
End: 2023-08-10
Payer: MEDICARE

## 2023-08-10 PROCEDURE — 17000 DESTRUCT PREMALG LESION: CPT | Mod: 59

## 2023-08-10 PROCEDURE — 99213 OFFICE O/P EST LOW 20 MIN: CPT | Mod: 25

## 2023-08-10 PROCEDURE — 17110 DESTRUCTION B9 LES UP TO 14: CPT

## 2023-08-10 PROCEDURE — 17003 DESTRUCT PREMALG LES 2-14: CPT | Mod: 59

## 2023-08-21 ENCOUNTER — APPOINTMENT (OUTPATIENT)
Dept: DERMATOLOGY | Facility: CLINIC | Age: 78
End: 2023-08-21
Payer: MEDICARE

## 2023-08-21 PROCEDURE — 11404 EXC TR-EXT B9+MARG 3.1-4 CM: CPT

## 2023-08-21 PROCEDURE — 12032 INTMD RPR S/A/T/EXT 2.6-7.5: CPT

## 2023-08-21 RX ORDER — MUPIROCIN 20 MG/G
2 OINTMENT TOPICAL TWICE DAILY
Qty: 1 | Refills: 2 | Status: ACTIVE | COMMUNITY
Start: 2023-08-21 | End: 1900-01-01

## 2023-08-21 NOTE — PROCEDURE
[TextEntry] : Excision Operative Note  Indications:  Alternative therapies were discussed. Given the size, location, and tumor type we decided that excision offers the best option for treatment.  Preoperative diagnosis: pilar cyst  Postoperative diagnosis: Same Location: Right scalp  Anesthetic: 1% lidocaine with 1:100,000 epinephrine Antiseptic: Chlorhexidine or Betadine Attending surgeon: Shar Oneill MD Assistant(s): Chris aHrrington RN, Shannan Farias MD Estimated blood loss: < 5cc  Complications: None Initial lesion size: 3.3 cm x 2.5 cm Surgical margin: 0 cm Total excision size (always includes surgical margin):  3.3 cm x 2.5 cm Closure type: Intermediate linear layered Length of closure: 3.5 cm Suture material: 4-0 Vicryl, 4-0 Chromic   The patient was brought back to the minor surgery operating room. Prior to the procedure the medical record was reviewed by the physician. A pre-procedure checklist in the presence of the patient was reviewed. A surgery " timeout " was observed. The following were confirmed during the surgery timeout: patient name, confirmation of consent, patient position, allergies, type of anesthesia, and antibiotic given (if any, see emr entry for any medications).  The risks of the procedure, including bleeding, infection, nerve damage, possibility of recurrence, failure of the repair, and the certainty of a scar were discussed with the patient. Alternatives to the procedure, as well as their risks and benefits, were also discussed. The patient was offered an opportunity to ask any questions and, after expressing understanding of the proposed procedure and its alternatives, the patient signed the consent form. The patient was placed in appropriate position and the area was prepared and draped in the usual manner. Local anesthesia was obtained with the above mentioned anesthetic. Using a sterile surgical marking pen, an elliptical (or circular) excision was designed around the lesion and along relaxed skin tension lines, if possible, incorporating the margin of normal-appearing skin mentioned above. A full thickness skin incision using a #15 blade Bard-Nitesh scalpel was performed and the lesion was excised sharply in the subcutaneous plane.  The specimen was submitted for histopathologic examination.  The defect was moderately undermined in the subcutaneous plane if needed to reduce wound closure tension and allow for easy wound edge eversion.  Hemostasis was maintained with the electrosurgical unit.  Interrupted, inverted, subcuticular buried mattress sutures were placed using the material mentioned above to approximate the dermal edges of the wound. Interrupted and running simple cutaneous sutures were used to further approximate and monisha the wound edges.  The final length of the repair is listed in the summary above. A firm pressure dressing was applied over vaseline ointment and Telfa. Verbal postoperative wound care instructions were given and a wound care hand out was dispensed.  The patient was asked to follow up for a wound check as specified. The patient was also told to call us at anytime for signs of wound infection or any other concerns they might have regarding the surgery or the healing process.  The patient tolerated the procedure well and ambulated from the operatory without problem.

## 2023-08-21 NOTE — HISTORY OF PRESENT ILLNESS
[FreeTextEntry1] : Pilar cyst of the right scalp  [de-identified] : Dermatologic Surgery Consultation  08/21/2023  We had the pleasure of seeing your patient in consultation for Dermatologic Surgery.   Ms. SELIN WILLSON is a 78 year old F who presents for pilar cyst of the right scalp. She has had it for some time, slowly getting larger. It is tender when pressed and bothersome.    Pertinent positives noted below History of HIV or hepatitis: No Blood thinners: No Antibiotic Prophylaxis: None Medical implants: None     The patient's review of systems questionnaire was reviewed. Education needs were identified. There were no barriers to learning.   Dermatologic surgery consultation for pilar cyst of the right scalp   -- I explained the treatment options of excision vs. clinical observation. Based on the symptomatic nature, advised excision is reasonable -I explained that following extirpation there will be a full thickness defect of the involved area. The reconstructive options will be based on the defect size and surrounding tissue laxity of the involved area. Primary closure is only possible for smaller defects. For larger defects, local tissue rearrangement or skin grafting may be necessary. Risks following layered primary closure or local tissue rearrangement include wound dehiscence, contour irregularity, bleeding, infection, and paresthesia (nerve damage including sensory deficit or motor weakness). Risks following skin grafting include wound dehiscence, skin graft nonadherence (partial or complete), contour irregularity, bleeding, infection, paresthesia, and donor site complications. I explained that the patient will need to abstain from physical activity for 1-2 weeks following the surgery, that they would heal with a scar, and also discussed the chances of infection, bleeding, potential sensory or motor nerve damage, and recurrence.  The patient indicated that s/he understood the risks and wished to proceed today -- In particular, for reconstruction we discussed linear  Thank you for this dermatologic surgery referral. We recommended that Ms. SELIN WILLSON follow up with Her referring dermatologist for routine skin exams following surgery.

## 2023-08-21 NOTE — PHYSICAL EXAM
[Alert] : alert [Oriented x 3] : ~L oriented x 3 [Well Nourished] : well nourished [Conjunctiva Non-injected] : conjunctiva non-injected [No Visual Lymphadenopathy] : no visual  lymphadenopathy [No Clubbing] : no clubbing [No Edema] : no edema [No Bromhidrosis] : no bromhidrosis [No Chromhidrosis] : no chromhidrosis [Hair] : Hair [Scalp] : Scalp [Face] : Face [Nose] : Nose [Eyelids] : Eyelids [Ears] : Ears [Lips] : Lips [Neck] : Neck [Nodes] : Nodes [FreeTextEntry3] : 3.3 x 2.5 cm subcutaneous nodule of the Right scalp

## 2023-08-21 NOTE — END OF VISIT
[] : Resident [FreeTextEntry3] : I personally saw and examined this patient with the resident physician and was present for the key portions of history taking, examination as well as the excision and reconstruction procedures performed .  I agree with the assessment and plan as documented in the resident's note unless noted below.   Shar Oneill MD Physician, Dermatology and Dermatologic Surgery [facility]

## 2023-08-21 NOTE — ASSESSMENT
[FreeTextEntry1] : 1. pilar cyst right scalp --excision performed today , will follow up on final pathology -- intermediate linear repair performed  -- f/u for wound check PRN  -- mupirocin ointment BID o AA, SED -- f/u for routine skin exams as previously recommended by referring dermatologist   Thank you for this dermatologic surgery referral.   Reason MD Mai  Physician, Dermatology & Dermatologic Surgery  [facility]

## 2023-09-22 ENCOUNTER — APPOINTMENT (OUTPATIENT)
Dept: DERMATOLOGY | Facility: CLINIC | Age: 78
End: 2023-09-22
Payer: MEDICARE

## 2023-09-22 ENCOUNTER — RESULT REVIEW (OUTPATIENT)
Age: 78
End: 2023-09-22

## 2023-09-22 DIAGNOSIS — L72.11 PILAR CYST: ICD-10-CM

## 2023-09-22 PROCEDURE — 12032 INTMD RPR S/A/T/EXT 2.6-7.5: CPT

## 2023-09-22 PROCEDURE — 11423 EXC H-F-NK-SP B9+MARG 2.1-3: CPT

## 2023-09-24 PROBLEM — L72.11 PILAR CYST: Status: ACTIVE | Noted: 2023-08-21

## 2023-09-25 ENCOUNTER — APPOINTMENT (OUTPATIENT)
Dept: DERMATOLOGY | Facility: CLINIC | Age: 78
End: 2023-09-25

## 2023-12-20 ENCOUNTER — APPOINTMENT (OUTPATIENT)
Dept: OBGYN | Facility: CLINIC | Age: 78
End: 2023-12-20
Payer: MEDICARE

## 2023-12-20 VITALS
HEIGHT: 67 IN | WEIGHT: 119 LBS | DIASTOLIC BLOOD PRESSURE: 66 MMHG | SYSTOLIC BLOOD PRESSURE: 143 MMHG | BODY MASS INDEX: 18.68 KG/M2

## 2023-12-20 DIAGNOSIS — Z00.00 ENCOUNTER FOR GENERAL ADULT MEDICAL EXAMINATION W/OUT ABNORMAL FINDINGS: ICD-10-CM

## 2023-12-20 DIAGNOSIS — Z01.419 ENCOUNTER FOR GYNECOLOGICAL EXAMINATION (GENERAL) (ROUTINE) W/OUT ABNORMAL FINDINGS: ICD-10-CM

## 2023-12-20 PROCEDURE — G0101: CPT

## 2023-12-20 PROCEDURE — 99397 PER PM REEVAL EST PAT 65+ YR: CPT | Mod: GY

## 2023-12-20 RX ORDER — MONTELUKAST 10 MG/1
10 TABLET, FILM COATED ORAL
Qty: 90 | Refills: 0 | Status: DISCONTINUED | COMMUNITY
Start: 2020-06-01 | End: 2023-12-20

## 2023-12-20 NOTE — HISTORY OF PRESENT ILLNESS
[FreeTextEntry1] : 77 yo  2 sons, suff co , lives in [facility]. younger son in .   pt here for annual exam  denies jennifer hallman, eveline pain

## 2023-12-27 LAB — CYTOLOGY CVX/VAG DOC THIN PREP: ABNORMAL

## 2024-06-10 ENCOUNTER — APPOINTMENT (OUTPATIENT)
Dept: DERMATOLOGY | Facility: CLINIC | Age: 79
End: 2024-06-10
Payer: MEDICARE

## 2024-06-10 PROCEDURE — 17000 DESTRUCT PREMALG LESION: CPT

## 2024-06-10 PROCEDURE — 99213 OFFICE O/P EST LOW 20 MIN: CPT | Mod: 25

## 2024-06-11 ENCOUNTER — OFFICE (OUTPATIENT)
Dept: URBAN - METROPOLITAN AREA CLINIC 112 | Facility: CLINIC | Age: 79
Setting detail: OPHTHALMOLOGY
End: 2024-06-11
Payer: MEDICARE

## 2024-06-11 DIAGNOSIS — H16.222: ICD-10-CM

## 2024-06-11 DIAGNOSIS — H02.521: ICD-10-CM

## 2024-06-11 DIAGNOSIS — H16.223: ICD-10-CM

## 2024-06-11 DIAGNOSIS — H02.423: ICD-10-CM

## 2024-06-11 DIAGNOSIS — H16.221: ICD-10-CM

## 2024-06-11 DIAGNOSIS — H02.524: ICD-10-CM

## 2024-06-11 PROBLEM — H02.422 PTOSIS MYOGENIC; RIGHT EYE, LEFT EYE, BOTH EYES: Status: ACTIVE | Noted: 2024-06-11

## 2024-06-11 PROBLEM — H02.421 PTOSIS MYOGENIC; RIGHT EYE, LEFT EYE, BOTH EYES: Status: ACTIVE | Noted: 2024-06-11

## 2024-06-11 PROCEDURE — 92082 INTERMEDIATE VISUAL FIELD XM: CPT | Performed by: OPHTHALMOLOGY

## 2024-06-11 PROCEDURE — 83861 MICROFLUID ANALY TEARS: CPT | Mod: QW,LT | Performed by: OPHTHALMOLOGY

## 2024-06-11 PROCEDURE — 92285 EXTERNAL OCULAR PHOTOGRAPHY: CPT | Performed by: OPHTHALMOLOGY

## 2024-06-11 PROCEDURE — 83861 MICROFLUID ANALY TEARS: CPT | Mod: QW,RT | Performed by: OPHTHALMOLOGY

## 2024-06-11 PROCEDURE — 99214 OFFICE O/P EST MOD 30 MIN: CPT | Performed by: OPHTHALMOLOGY

## 2024-07-15 ENCOUNTER — ASC (OUTPATIENT)
Dept: URBAN - METROPOLITAN AREA SURGERY 8 | Facility: SURGERY | Age: 79
Setting detail: OPHTHALMOLOGY
End: 2024-07-15
Payer: MEDICARE

## 2024-07-15 DIAGNOSIS — H02.421: ICD-10-CM

## 2024-07-15 DIAGNOSIS — H04.422: ICD-10-CM

## 2024-07-15 DIAGNOSIS — H02.521: ICD-10-CM

## 2024-07-15 DIAGNOSIS — H02.524: ICD-10-CM

## 2024-07-15 PROCEDURE — 67904 REPAIR EYELID DEFECT: CPT | Mod: 50 | Performed by: OPHTHALMOLOGY

## 2024-07-15 PROCEDURE — 67900 REPAIR BROW DEFECT: CPT | Mod: 50 | Performed by: OPHTHALMOLOGY

## 2024-07-16 ENCOUNTER — OFFICE (OUTPATIENT)
Dept: URBAN - METROPOLITAN AREA CLINIC 94 | Facility: CLINIC | Age: 79
Setting detail: OPHTHALMOLOGY
End: 2024-07-16
Payer: MEDICARE

## 2024-07-16 DIAGNOSIS — H02.524: ICD-10-CM

## 2024-07-16 DIAGNOSIS — H02.521: ICD-10-CM

## 2024-07-16 DIAGNOSIS — H02.422: ICD-10-CM

## 2024-07-16 DIAGNOSIS — H02.421: ICD-10-CM

## 2024-07-16 DIAGNOSIS — H02.423: ICD-10-CM

## 2024-07-16 PROCEDURE — 99024 POSTOP FOLLOW-UP VISIT: CPT | Performed by: OPHTHALMOLOGY

## 2024-07-16 ASSESSMENT — CONFRONTATIONAL VISUAL FIELD TEST (CVF)
OD_FINDINGS: FULL
OS_FINDINGS: FULL

## 2024-08-05 ENCOUNTER — RX ONLY (RX ONLY)
Age: 79
End: 2024-08-05

## 2024-08-05 ENCOUNTER — OFFICE (OUTPATIENT)
Dept: URBAN - METROPOLITAN AREA CLINIC 112 | Facility: CLINIC | Age: 79
Setting detail: OPHTHALMOLOGY
End: 2024-08-05
Payer: MEDICARE

## 2024-08-05 DIAGNOSIS — H02.524: ICD-10-CM

## 2024-08-05 DIAGNOSIS — H02.422: ICD-10-CM

## 2024-08-05 DIAGNOSIS — H02.423: ICD-10-CM

## 2024-08-05 DIAGNOSIS — H02.421: ICD-10-CM

## 2024-08-05 DIAGNOSIS — H02.521: ICD-10-CM

## 2024-08-05 PROCEDURE — 99024 POSTOP FOLLOW-UP VISIT: CPT | Performed by: REGISTERED NURSE

## 2024-08-05 ASSESSMENT — CONFRONTATIONAL VISUAL FIELD TEST (CVF)
OS_FINDINGS: FULL
OD_FINDINGS: FULL

## 2024-08-20 ENCOUNTER — OFFICE (OUTPATIENT)
Dept: URBAN - METROPOLITAN AREA CLINIC 94 | Facility: CLINIC | Age: 79
Setting detail: OPHTHALMOLOGY
End: 2024-08-20
Payer: MEDICARE

## 2024-08-20 DIAGNOSIS — H02.521: ICD-10-CM

## 2024-08-20 DIAGNOSIS — H02.423: ICD-10-CM

## 2024-08-20 DIAGNOSIS — H02.524: ICD-10-CM

## 2024-08-20 DIAGNOSIS — H02.421: ICD-10-CM

## 2024-08-20 DIAGNOSIS — H02.422: ICD-10-CM

## 2024-08-20 DIAGNOSIS — T81.31XA: ICD-10-CM

## 2024-08-20 PROCEDURE — 66250 FOLLOW-UP SURGERY OF EYE: CPT | Mod: 78,LT | Performed by: OPHTHALMOLOGY

## 2024-08-20 PROCEDURE — 99024 POSTOP FOLLOW-UP VISIT: CPT | Performed by: OPHTHALMOLOGY

## 2024-08-20 ASSESSMENT — CONFRONTATIONAL VISUAL FIELD TEST (CVF)
OS_FINDINGS: FULL
OD_FINDINGS: FULL

## 2024-09-05 ENCOUNTER — OFFICE (OUTPATIENT)
Dept: URBAN - METROPOLITAN AREA CLINIC 105 | Facility: CLINIC | Age: 79
Setting detail: OPHTHALMOLOGY
End: 2024-09-05
Payer: MEDICARE

## 2024-09-05 ENCOUNTER — RX ONLY (RX ONLY)
Age: 79
End: 2024-09-05

## 2024-09-05 DIAGNOSIS — H02.524: ICD-10-CM

## 2024-09-05 DIAGNOSIS — H02.423: ICD-10-CM

## 2024-09-05 DIAGNOSIS — H02.422: ICD-10-CM

## 2024-09-05 DIAGNOSIS — H02.421: ICD-10-CM

## 2024-09-05 DIAGNOSIS — H02.521: ICD-10-CM

## 2024-09-05 DIAGNOSIS — T81.31XA: ICD-10-CM

## 2024-09-05 PROCEDURE — 99024 POSTOP FOLLOW-UP VISIT: CPT | Performed by: REGISTERED NURSE

## 2024-09-05 ASSESSMENT — CONFRONTATIONAL VISUAL FIELD TEST (CVF)
OD_FINDINGS: FULL
OS_FINDINGS: FULL

## 2024-10-14 ENCOUNTER — APPOINTMENT (OUTPATIENT)
Dept: DERMATOLOGY | Facility: CLINIC | Age: 79
End: 2024-10-14
Payer: MEDICARE

## 2024-10-14 DIAGNOSIS — D17.21 BENIGN LIPOMATOUS NEOPLASM OF SKIN AND SUBCUTANEOUS TISSUE OF RIGHT ARM: ICD-10-CM

## 2024-10-14 DIAGNOSIS — D17.23 BENIGN LIPOMATOUS NEOPLASM OF SKIN AND SUBCUTANEOUS TISSUE OF RIGHT LEG: ICD-10-CM

## 2024-10-14 PROCEDURE — 99213 OFFICE O/P EST LOW 20 MIN: CPT

## 2024-10-15 PROBLEM — D17.21 LIPOMA OF RIGHT UPPER EXTREMITY: Status: ACTIVE | Noted: 2024-10-15

## 2024-10-15 PROBLEM — D17.23 LIPOMA OF RIGHT LOWER EXTREMITY: Status: ACTIVE | Noted: 2024-10-15

## 2024-10-16 ENCOUNTER — OUTPATIENT (OUTPATIENT)
Dept: OUTPATIENT SERVICES | Facility: HOSPITAL | Age: 79
LOS: 1 days | End: 2024-10-16
Payer: MEDICARE

## 2024-10-16 ENCOUNTER — APPOINTMENT (OUTPATIENT)
Dept: ULTRASOUND IMAGING | Facility: CLINIC | Age: 79
End: 2024-10-16

## 2024-10-16 DIAGNOSIS — D17.23 BENIGN LIPOMATOUS NEOPLASM OF SKIN AND SUBCUTANEOUS TISSUE OF RIGHT LEG: ICD-10-CM

## 2024-10-16 PROCEDURE — 76882 US LMTD JT/FCL EVL NVASC XTR: CPT | Mod: 26,RT

## 2024-10-17 ENCOUNTER — APPOINTMENT (OUTPATIENT)
Dept: DERMATOLOGY | Facility: CLINIC | Age: 79
End: 2024-10-17

## 2024-10-18 ENCOUNTER — APPOINTMENT (OUTPATIENT)
Dept: SURGERY | Facility: CLINIC | Age: 79
End: 2024-10-18
Payer: MEDICARE

## 2024-10-18 VITALS
WEIGHT: 122 LBS | SYSTOLIC BLOOD PRESSURE: 146 MMHG | HEART RATE: 65 BPM | BODY MASS INDEX: 20.08 KG/M2 | RESPIRATION RATE: 16 BRPM | OXYGEN SATURATION: 97 % | DIASTOLIC BLOOD PRESSURE: 64 MMHG | TEMPERATURE: 97.5 F | HEIGHT: 65.5 IN

## 2024-10-18 DIAGNOSIS — M62.89 OTHER SPECIFIED DISORDERS OF MUSCLE: ICD-10-CM

## 2024-10-18 PROCEDURE — 99204 OFFICE O/P NEW MOD 45 MIN: CPT

## 2024-11-04 ENCOUNTER — APPOINTMENT (OUTPATIENT)
Dept: DERMATOLOGY | Facility: CLINIC | Age: 79
End: 2024-11-04
Payer: MEDICARE

## 2024-11-04 PROCEDURE — 17000 DESTRUCT PREMALG LESION: CPT

## 2024-11-04 PROCEDURE — 99213 OFFICE O/P EST LOW 20 MIN: CPT | Mod: 25

## 2024-11-05 ENCOUNTER — APPOINTMENT (OUTPATIENT)
Dept: ORTHOPEDIC SURGERY | Facility: CLINIC | Age: 79
End: 2024-11-05
Payer: MEDICARE

## 2024-11-05 VITALS — BODY MASS INDEX: 18.64 KG/M2 | WEIGHT: 123 LBS | HEIGHT: 68 IN

## 2024-11-05 DIAGNOSIS — D17.21 BENIGN LIPOMATOUS NEOPLASM OF SKIN AND SUBCUTANEOUS TISSUE OF RIGHT ARM: ICD-10-CM

## 2024-11-05 PROCEDURE — 99203 OFFICE O/P NEW LOW 30 MIN: CPT

## 2024-11-05 PROCEDURE — 73030 X-RAY EXAM OF SHOULDER: CPT | Mod: RT

## 2024-11-20 PROCEDURE — 76882 US LMTD JT/FCL EVL NVASC XTR: CPT

## 2024-12-04 ENCOUNTER — APPOINTMENT (OUTPATIENT)
Dept: MRI IMAGING | Facility: CLINIC | Age: 79
End: 2024-12-04
Payer: MEDICARE

## 2024-12-04 ENCOUNTER — OUTPATIENT (OUTPATIENT)
Dept: OUTPATIENT SERVICES | Facility: HOSPITAL | Age: 79
LOS: 1 days | End: 2024-12-04
Payer: MEDICARE

## 2024-12-04 DIAGNOSIS — D17.21 BENIGN LIPOMATOUS NEOPLASM OF SKIN AND SUBCUTANEOUS TISSUE OF RIGHT ARM: ICD-10-CM

## 2024-12-04 PROCEDURE — 73223 MRI JOINT UPR EXTR W/O&W/DYE: CPT | Mod: 26,RT,MH

## 2024-12-04 PROCEDURE — 73223 MRI JOINT UPR EXTR W/O&W/DYE: CPT

## 2024-12-04 PROCEDURE — A9585: CPT

## 2024-12-30 ENCOUNTER — APPOINTMENT (OUTPATIENT)
Dept: OBGYN | Facility: CLINIC | Age: 79
End: 2024-12-30

## 2025-02-06 ENCOUNTER — NON-APPOINTMENT (OUTPATIENT)
Age: 80
End: 2025-02-06

## 2025-06-10 ENCOUNTER — APPOINTMENT (OUTPATIENT)
Dept: DERMATOLOGY | Facility: CLINIC | Age: 80
End: 2025-06-10
Payer: MEDICARE

## 2025-06-10 PROCEDURE — 99213 OFFICE O/P EST LOW 20 MIN: CPT

## 2025-06-17 ENCOUNTER — APPOINTMENT (OUTPATIENT)
Dept: OBGYN | Facility: CLINIC | Age: 80
End: 2025-06-17
Payer: MEDICARE

## 2025-06-17 VITALS
WEIGHT: 121 LBS | DIASTOLIC BLOOD PRESSURE: 75 MMHG | HEART RATE: 71 BPM | HEIGHT: 68 IN | SYSTOLIC BLOOD PRESSURE: 124 MMHG | BODY MASS INDEX: 18.34 KG/M2

## 2025-06-17 PROCEDURE — 99213 OFFICE O/P EST LOW 20 MIN: CPT
